# Patient Record
Sex: FEMALE | Race: BLACK OR AFRICAN AMERICAN | NOT HISPANIC OR LATINO | Employment: FULL TIME | URBAN - METROPOLITAN AREA
[De-identification: names, ages, dates, MRNs, and addresses within clinical notes are randomized per-mention and may not be internally consistent; named-entity substitution may affect disease eponyms.]

---

## 2017-11-27 DIAGNOSIS — M25.562 LEFT KNEE PAIN: Primary | ICD-10-CM

## 2024-12-14 ENCOUNTER — HOSPITAL ENCOUNTER (EMERGENCY)
Facility: OTHER | Age: 49
Discharge: HOME | End: 2024-12-14
Attending: EMERGENCY MEDICINE

## 2024-12-14 VITALS
HEIGHT: 66 IN | RESPIRATION RATE: 20 BRPM | SYSTOLIC BLOOD PRESSURE: 145 MMHG | TEMPERATURE: 98 F | HEART RATE: 75 BPM | OXYGEN SATURATION: 100 % | BODY MASS INDEX: 46.61 KG/M2 | WEIGHT: 290 LBS | DIASTOLIC BLOOD PRESSURE: 71 MMHG

## 2024-12-14 DIAGNOSIS — M19.90 ARTHRITIS: ICD-10-CM

## 2024-12-14 DIAGNOSIS — R00.2 PALPITATIONS: Primary | ICD-10-CM

## 2024-12-14 PROCEDURE — 93005 ELECTROCARDIOGRAM TRACING: CPT

## 2024-12-14 PROCEDURE — 99283 EMERGENCY DEPT VISIT LOW MDM: CPT | Mod: 25

## 2024-12-14 PROCEDURE — 93010 ELECTROCARDIOGRAM REPORT: CPT | Mod: ,,, | Performed by: INTERNAL MEDICINE

## 2024-12-14 RX ORDER — MELOXICAM 15 MG/1
15 TABLET ORAL DAILY
Qty: 30 TABLET | Refills: 0 | Status: SHIPPED | OUTPATIENT
Start: 2024-12-14

## 2024-12-14 NOTE — ED PROVIDER NOTES
"Encounter Date: 12/14/2024    SCRIBE #1 NOTE: I, Kiko Tanner, am scribing for, and in the presence of,  Timur Summers MD. I have scribed the following portions of the note - Other sections scribed: HPI, PE.       History     Chief Complaint   Patient presents with    Leg Pain     Nontraumatic generalized bilateral leg pain x 1 year without relief from Tylenol. Denies swelling. Hx arthritis.     Palpitations     Intermittent episodes of palpitations lasting ~2 min onset 3 weeks ago. Reports relief with deep breathing. Denies CP, SOB, nausea, cardiac hx. HTN, reports compliance with HCTZ.     Time seen by provider: 7:54 AM    This is a 49 y.o. female who obstructive sleep apnea (not on CPAP) and diabetes presents with complaint of bilateral leg pain and heart palpitations. She reports her heart palpitations began approxmately 2-3 weeks ago and describes it as her heart beating fast with some skipped beats. She reports these episodes are short, self limiting and happens randomly throughout the day not tied to exertion or rest. She reports her leg pain is due to her arthritis and has been going on for over a year. She states that she has tried ibuprofen in the past, but she does not like using it because she states it doesn't work. She reports using tylenol, but states the tylenol is no longer controlling her pain. She states her PCP is Dr. Hicks at [facility].     This is the extent of the patient's complaints at this time.        The history is provided by the patient.     Review of patient's allergies indicates:   Allergen Reactions    Amoxicillin Other (See Comments)     Pt states medication did not make her feel well and made her "body feel slow"     Past Medical History:   Diagnosis Date    Hypertension      Past Surgical History:   Procedure Laterality Date    TUBAL LIGATION       No family history on file.  Social History     Tobacco Use    Smoking status: Never   Substance Use Topics    Alcohol use: No    Drug " use: No     Review of Systems  See HPI    Physical Exam     Initial Vitals [12/14/24 0736]   BP Pulse Resp Temp SpO2   (!) 189/98 81 16 98.2 °F (36.8 °C) 100 %      MAP       --         Physical Exam    Nursing note and vitals reviewed.  Constitutional: She appears well-developed and well-nourished.   HENT:   Head: Normocephalic and atraumatic.   Eyes: Conjunctivae are normal.   Neck: Neck supple.   Cardiovascular:  Normal rate, regular rhythm and normal heart sounds.     Exam reveals no gallop and no friction rub.       No murmur heard.  Pulmonary/Chest: Breath sounds normal. No respiratory distress. She has no wheezes. She has no rhonchi. She has no rales.   Musculoskeletal:      Cervical back: Neck supple.      Right knee: No swelling or bony tenderness. Normal range of motion. No tenderness.      Left knee: No swelling or bony tenderness. Normal range of motion. No tenderness.      Right lower leg: No tenderness or bony tenderness. No edema (pitting).      Left lower leg: No tenderness or bony tenderness. No edema (pitting).      Right ankle: No tenderness. Normal range of motion.      Left ankle: No tenderness. Normal range of motion.     Neurological: She is alert and oriented to person, place, and time.   Skin: Skin is warm and dry.   Psychiatric: She has a normal mood and affect.         ED Course   Procedures  Labs Reviewed - No data to display         Imaging Results    None          Medications - No data to display  Medical Decision Making  Differential diagnosis includes SVT, atrial fibrillation, PVCs, arthritis    Amount and/or Complexity of Data Reviewed  External Data Reviewed: notes.  ECG/medicine tests: ordered and independent interpretation performed.     Details: Per my independent interpretation: Sinus rhythm. Rate of 80. No ectopy. No ST or T wave changes. No LVH. No ischemia or arrhythmia.       Risk  Prescription drug management.    Patient presents complaining of palpitations.  They are not  associated with exertion.  No chest pain dizziness or syncope.  The patient does not have cardiac history.  Her EKG was unremarkable.  The patient is kept on the cardiac monitor for over an hour.  I do not see any ectopy or arrhythmia.  Discussed follow up with primary care, the patient continues to have palpitations may need Holter monitor or further workup.  Will also refer to cardiology.  Additional complaint of ongoing arthritis pain.  No acute change in this.  Currently taking Tylenol.  Will give prescription for Mobic.  Encouraged follow up with primary care for re-evaluation of this is well        Scribe Attestation:   Scribe #1: I performed the above scribed service and the documentation accurately describes the services I performed. I attest to the accuracy of the note.              Physician Attestation for Scribe: I, TL, reviewed documentation as scribed in my presence, which is both accurate and complete.                   Clinical Impression:  Final diagnoses:  [R00.2] Palpitations (Primary)  [M19.90] Arthritis          ED Disposition Condition    Discharge Stable          ED Prescriptions       Medication Sig Dispense Start Date End Date Auth. Provider    meloxicam (MOBIC) 15 MG tablet Take 1 tablet (15 mg total) by mouth once daily. 30 tablet 12/14/2024 -- Timur Summers II, MD          Follow-up Information       Follow up With Specialties Details Why Contact [facility]  In 1 week  [address] [facility] [address]  [phone]               Timur Summers II, MD  12/15/24 9256

## 2024-12-16 LAB
OHS QRS DURATION: 98 MS
OHS QTC CALCULATION: 449 MS